# Patient Record
Sex: MALE | Race: WHITE | NOT HISPANIC OR LATINO | URBAN - METROPOLITAN AREA
[De-identification: names, ages, dates, MRNs, and addresses within clinical notes are randomized per-mention and may not be internally consistent; named-entity substitution may affect disease eponyms.]

---

## 2019-04-10 ENCOUNTER — OFFICE (AMBULATORY)
Dept: URBAN - METROPOLITAN AREA PATHOLOGY 4 | Facility: PATHOLOGY | Age: 74
End: 2019-04-10

## 2019-04-10 DIAGNOSIS — Z86.010 PERSONAL HISTORY OF COLONIC POLYPS: ICD-10-CM

## 2019-04-10 DIAGNOSIS — D12.0 BENIGN NEOPLASM OF CECUM: ICD-10-CM

## 2019-04-10 DIAGNOSIS — D12.3 BENIGN NEOPLASM OF TRANSVERSE COLON: ICD-10-CM

## 2019-04-10 PROCEDURE — 88305 TISSUE EXAM BY PATHOLOGIST: CPT | Mod: 26 | Performed by: INTERNAL MEDICINE

## 2019-04-11 ENCOUNTER — ON CAMPUS - OUTPATIENT (AMBULATORY)
Dept: URBAN - METROPOLITAN AREA HOSPITAL 2 | Facility: HOSPITAL | Age: 74
End: 2019-04-11

## 2019-04-11 ENCOUNTER — HOSPITAL ENCOUNTER (OUTPATIENT)
Dept: OTHER | Facility: HOSPITAL | Age: 74
Setting detail: SPECIMEN
Discharge: HOME | End: 2019-04-11
Attending: INTERNAL MEDICINE | Admitting: INTERNAL MEDICINE

## 2019-04-11 VITALS
HEART RATE: 79 BPM | HEART RATE: 84 BPM | DIASTOLIC BLOOD PRESSURE: 50 MMHG | OXYGEN SATURATION: 96 % | DIASTOLIC BLOOD PRESSURE: 46 MMHG | OXYGEN SATURATION: 97 % | SYSTOLIC BLOOD PRESSURE: 79 MMHG | RESPIRATION RATE: 18 BRPM | HEART RATE: 72 BPM | SYSTOLIC BLOOD PRESSURE: 91 MMHG | WEIGHT: 215 LBS | DIASTOLIC BLOOD PRESSURE: 71 MMHG | HEART RATE: 80 BPM | SYSTOLIC BLOOD PRESSURE: 117 MMHG | DIASTOLIC BLOOD PRESSURE: 76 MMHG | SYSTOLIC BLOOD PRESSURE: 142 MMHG | HEIGHT: 70 IN | SYSTOLIC BLOOD PRESSURE: 145 MMHG | HEART RATE: 68 BPM | DIASTOLIC BLOOD PRESSURE: 70 MMHG | OXYGEN SATURATION: 95 % | HEART RATE: 81 BPM | OXYGEN SATURATION: 94 % | HEART RATE: 76 BPM | RESPIRATION RATE: 16 BRPM | HEART RATE: 83 BPM | SYSTOLIC BLOOD PRESSURE: 95 MMHG | TEMPERATURE: 97 F | DIASTOLIC BLOOD PRESSURE: 56 MMHG | RESPIRATION RATE: 12 BRPM | DIASTOLIC BLOOD PRESSURE: 45 MMHG | SYSTOLIC BLOOD PRESSURE: 144 MMHG | SYSTOLIC BLOOD PRESSURE: 104 MMHG

## 2019-04-11 DIAGNOSIS — Z86.010 PERSONAL HISTORY OF COLONIC POLYPS: ICD-10-CM

## 2019-04-11 DIAGNOSIS — K57.30 DIVERTICULOSIS OF LARGE INTESTINE WITHOUT PERFORATION OR ABS: ICD-10-CM

## 2019-04-11 DIAGNOSIS — D12.2 BENIGN NEOPLASM OF ASCENDING COLON: ICD-10-CM

## 2019-04-11 DIAGNOSIS — D12.0 BENIGN NEOPLASM OF CECUM: ICD-10-CM

## 2019-04-11 DIAGNOSIS — D12.3 BENIGN NEOPLASM OF TRANSVERSE COLON: ICD-10-CM

## 2019-04-11 DIAGNOSIS — K64.0 FIRST DEGREE HEMORRHOIDS: ICD-10-CM

## 2019-04-11 LAB
GI HISTOLOGY: A. SELECT: (no result)
GI HISTOLOGY: B. UNSPECIFIED: (no result)
GI HISTOLOGY: PDF REPORT: (no result)

## 2019-04-11 PROCEDURE — 45385 COLONOSCOPY W/LESION REMOVAL: CPT | Mod: PT | Performed by: INTERNAL MEDICINE

## 2019-09-03 ENCOUNTER — OFFICE VISIT (OUTPATIENT)
Dept: CARDIOLOGY | Facility: CLINIC | Age: 74
End: 2019-09-03

## 2019-09-03 VITALS
WEIGHT: 227 LBS | HEIGHT: 70 IN | DIASTOLIC BLOOD PRESSURE: 82 MMHG | HEART RATE: 87 BPM | SYSTOLIC BLOOD PRESSURE: 158 MMHG | BODY MASS INDEX: 32.5 KG/M2

## 2019-09-03 DIAGNOSIS — I25.810 CORONARY ARTERY DISEASE INVOLVING CORONARY BYPASS GRAFT OF NATIVE HEART WITHOUT ANGINA PECTORIS: Primary | ICD-10-CM

## 2019-09-03 DIAGNOSIS — I10 ESSENTIAL HYPERTENSION: ICD-10-CM

## 2019-09-03 DIAGNOSIS — Z95.1 S/P CABG (CORONARY ARTERY BYPASS GRAFT): ICD-10-CM

## 2019-09-03 DIAGNOSIS — E78.2 MIXED HYPERLIPIDEMIA: ICD-10-CM

## 2019-09-03 PROCEDURE — 99213 OFFICE O/P EST LOW 20 MIN: CPT | Performed by: INTERNAL MEDICINE

## 2019-09-03 PROCEDURE — 93000 ELECTROCARDIOGRAM COMPLETE: CPT | Performed by: INTERNAL MEDICINE

## 2019-09-03 RX ORDER — ASPIRIN 81 MG/1
81 TABLET ORAL DAILY
COMMUNITY
Start: 2013-06-26

## 2019-09-03 RX ORDER — LANCETS
EACH MISCELLANEOUS
Refills: 3 | COMMUNITY
Start: 2019-07-18

## 2019-09-03 RX ORDER — BLOOD SUGAR DIAGNOSTIC
STRIP MISCELLANEOUS
Refills: 3 | COMMUNITY
Start: 2019-07-15

## 2019-09-03 RX ORDER — SIMVASTATIN 10 MG
10 TABLET ORAL NIGHTLY
COMMUNITY
Start: 2013-06-26

## 2019-09-03 RX ORDER — NITROGLYCERIN 0.4 MG/1
TABLET SUBLINGUAL
COMMUNITY

## 2019-09-03 RX ORDER — ICOSAPENT ETHYL 1000 MG/1
2 CAPSULE ORAL 2 TIMES DAILY WITH MEALS
Qty: 270 CAPSULE | Refills: 4 | Status: SHIPPED | OUTPATIENT
Start: 2019-09-03 | End: 2020-02-11

## 2019-09-03 RX ORDER — LOSARTAN POTASSIUM 50 MG/1
TABLET ORAL
COMMUNITY
End: 2020-02-04 | Stop reason: DRUGHIGH

## 2019-09-03 RX ORDER — LANSOPRAZOLE 15 MG/1
15 CAPSULE, DELAYED RELEASE ORAL DAILY
COMMUNITY
Start: 2013-06-26

## 2019-09-03 RX ORDER — METOPROLOL SUCCINATE 25 MG/1
25 TABLET, EXTENDED RELEASE ORAL DAILY
Refills: 3 | COMMUNITY
Start: 2019-06-21 | End: 2023-01-31

## 2019-09-03 RX ORDER — NIACIN 500 MG
500 TABLET ORAL
COMMUNITY
End: 2022-08-02 | Stop reason: ALTCHOICE

## 2019-09-03 RX ORDER — SITAGLIPTIN 100 MG/1
100 TABLET, FILM COATED ORAL DAILY
Refills: 3 | COMMUNITY
Start: 2019-07-15

## 2019-09-03 NOTE — PROGRESS NOTES
Visit Type:  Follow-up Visit- 6 month   Primary Care Provider:  [facility]ley          History of Present Illness:     Dear Florencio        CC:  Follow-up  for coronary artery disease,  previous CABG,  hypertension,  previous CVA dyslipidemia      Mr. Ras Zhao is a very pleasant 73 years old gentleman with history of chronic ischemic heart disease, status post previous CABG, previous CVA, hypertension and dyslipidemia. He denies any chest pain, dyspnea, or palpitations.         Mr. Zhao is quite stable from cardiac standpoint, and we will see him in the office in 6 moHe has discrepancy in his farm blood pressure is lower in the right arm.  His current therapy will be continued and will get his lipid profile from your office.     His last lipid profile in 3/1/2019 showed total cholesterol 159 triglycerides 216 HDL 45 LDL of 71.    I have instructed him to stop his niacin because of no outcome data available for major adverse cardiac events.  I have recommended Vascepa 2 g twice daily for secondary prevention/primary prevention since he is diabetic.  His blood pressure initially was 158/82 in the left arm.  Blood pressure on repeat check in the left arm was 132/80 and on the right arm was 140/78.    His EKG today showed normal sinus rhythm with incomplete right bundle branch block left posterior fascicular block and nonspecific ST-T changes similar to previous EKG of 2/19/2019.  His NH interval was 130 ms QRS duration was 119 ms QTC was 468 ms and QRS axis was 118.  EKG  Was similar to the one      Assessment/plan:    1.  Coronary artery disease status post CABG no anginal symptoms.    2.  Bifascicular block with incomplete right bundle branch block and left posterior fascicular block as before.    3.  Hypertension.  Blood pressure as noted above    4.  Dyslipidemia with hypertriglyceridemia.  Vest CPAP has been recommended and niacin has been discontinued.  If the CPAP is not approved by his insurance company will be  glad to send a preauthorization letter.    We will see him in the office in 6 months of follow-up     Thank you very much for allowing us to participate in the care of your patients                   Problems: Active problems were reviewed with the patient during this visit.  Medications: Medications were reviewed with the patient during this visit.  Allergies: Allergies were reviewed with the patient during this visit.  No Known Allergy.                   Past Medical History:     Reviewed history from 08/28/2013 and no changes required:        Hyperlipidemia        Hypertension        Coronary Artery Disease: S/P CABG         Diabetes, Type 2        C V A / Stroke- Hx: Left         Cerebrovascular Disease: with bilateral stenosis ,hx of. : S/P Endarterectomy         Hx: Gastroesphageal reflux     Past Surgical History:     Reviewed history from 08/27/2013 and no changes required:        JUANY PAVON G: Sept. 24,2008-- triple- ant. oblique marginal, LAD at the diagonal, posterior marginal         Cardiac Cath:9-23- 2008        Carotid Endarterectomy: Oct. 24, 2008     Active Medications (reviewed today):  JANUVIA TABLET (SITAGLIPTIN PHOSPHATE TABS) Take one by mouth daily  * MUPIROCIN CLOBETASOL TARO Twice daily  VITAMIN C TABLET (ASCORBIC ACID TABS) 1000mg Take one by mouth daily  TOPROL XL 25 MG ORAL TABLET EXTENDED RELEASE 24 HOUR (METOPROLOL SUCCINATE) Take 1 tablet by mouth daily  SIMVASTATIN 10 MG ORAL TABLET (SIMVASTATIN) Take 1 tablet by mouth daily  PREVACID 15 MG ORAL CAPSULE DELAYED RELEASE (LANSOPRAZOLE) Take 1 tablet by mouth daily  NITROSTAT 0.4 MG SUBLINGUAL TABLET SUBLINGUAL (NITROGLYCERIN) Place one tablet under tongue for chest pain as directed - PRN  NIACIN 500 MG ORAL TABLET (NIACIN) Take one by mouth daily  METFORMIN  MG ORAL TABLET (METFORMIN HCL) Take one two tablets by mouth twice a day  ZYRTEC ALLERGY 10 MG ORAL CAPSULE (CETIRIZINE HCL) Take one by mouth daily  LOSARTAN POTASSIUM 50 MG ORAL  TABLET (LOSARTAN POTASSIUM) Take one by mouth daily  ASPIR-LOW 81 MG ORAL TABLET DELAYED RELEASE (ASPIRIN) Take 1 tablet by mouth daily  MULTIVITAMINS TABS (MULTIPLE VITAMIN) Take one by mouth daily     Current Allergies (reviewed today):  No known allergies     Family History Summary:      Reviewed history Last on 08/14/2018 and no changes required:02/19/2019        General Comments - FH:  FH Diabetes father and  brothers  FH Heart Disease mother  FH Stroke-paternal grandmother  FH Hypertension- most of family         Social History:     Reviewed history from 08/28/2013 and no changes required:                1 daughter         Retired            Risk Factors:      Smoked Tobacco Use:  Former smoker     Cigarettes:  Yes -- 1 pack(s) per day,    Pack-years:  50 years --- 1 ppd - wkends 1-1/2 pack         Year started:  age 13         Year quit:  2000 or 2002  Smokeless Tobacco Use:  Never  Passive smoke exposure:  no  Drug use:  no  HIV high-risk behavior:  no  Caffeine use:  1 drinks per day  Alcohol use:  no  Exercise:  yes     Times per week:  3     Type of Exercise:  [employer]  Seatbelt use:  100 %  Sun Exposure:  occasionally     Family History Risk Factors:     Family History of MI in females < 65 years old:  no     Family History of MI in males < 55 years old:  no           Review of Systems   General: denies fevers, chills, sweats, anorexia, fatigue, malaise, weight loss  Eyes: denies blurring, diplopia, irritation, discharge, vision loss, eye pain, photophobia  Cardiovascular: History of coronary artery disease. Status post coronary artery bypass surgery 2008. History of previous CVA. History of bilateral carotid endarterectomy. Hypertension. Dyslipidemia.  Respiratory: Denies cough, dyspnea, excessive sputum, hemoptysis, wheezing  Gastrointestinal: GERD  Musculoskeletal: denies back pain, joint pain, joint swelling, muscle cramps, muscle weakness, stiffness, arthritis  Neurologic:  history of  CVA        Physical Exam     General:      well developed, well nourished, in no acute distress.    Neck:      no masses, thyromegaly, or abnormal cervical nodes. NO JV BRUITS    Lungs:      clear bilaterally to auscultation.    Heart:      non-displaced PMI, chest non-tender; regular rate and rhythm, S1, S2 without murmurs, rubs, or gallops  Pulses:      pulses normal in all 4 extremities.    Extremities:       NO EDEMA

## 2020-02-04 ENCOUNTER — OFFICE VISIT (OUTPATIENT)
Dept: CARDIOLOGY | Facility: CLINIC | Age: 75
End: 2020-02-04

## 2020-02-04 VITALS
BODY MASS INDEX: 32.47 KG/M2 | WEIGHT: 226.8 LBS | HEIGHT: 70 IN | DIASTOLIC BLOOD PRESSURE: 70 MMHG | SYSTOLIC BLOOD PRESSURE: 127 MMHG | OXYGEN SATURATION: 96 % | HEART RATE: 82 BPM

## 2020-02-04 DIAGNOSIS — I10 ESSENTIAL HYPERTENSION: ICD-10-CM

## 2020-02-04 DIAGNOSIS — J43.8 OTHER EMPHYSEMA (HCC): ICD-10-CM

## 2020-02-04 DIAGNOSIS — E78.2 MIXED HYPERLIPIDEMIA: ICD-10-CM

## 2020-02-04 DIAGNOSIS — E11.59 TYPE 2 DIABETES MELLITUS WITH OTHER CIRCULATORY COMPLICATION, WITHOUT LONG-TERM CURRENT USE OF INSULIN (HCC): ICD-10-CM

## 2020-02-04 DIAGNOSIS — Z79.02 LONG TERM (CURRENT) USE OF ANTITHROMBOTICS/ANTIPLATELETS: ICD-10-CM

## 2020-02-04 DIAGNOSIS — I25.10 CORONARY ARTERY DISEASE INVOLVING NATIVE CORONARY ARTERY OF NATIVE HEART WITHOUT ANGINA PECTORIS: Primary | ICD-10-CM

## 2020-02-04 PROCEDURE — 99214 OFFICE O/P EST MOD 30 MIN: CPT | Performed by: INTERNAL MEDICINE

## 2020-02-04 PROCEDURE — 93000 ELECTROCARDIOGRAM COMPLETE: CPT | Performed by: INTERNAL MEDICINE

## 2020-02-04 RX ORDER — LOSARTAN POTASSIUM 100 MG/1
TABLET ORAL DAILY
COMMUNITY
Start: 2019-12-23 | End: 2022-02-15

## 2020-02-04 RX ORDER — MULTIVIT WITH MINERALS/LUTEIN
TABLET ORAL DAILY
COMMUNITY

## 2020-02-04 NOTE — PROGRESS NOTES
"Cardiology Office Visit      Encounter Date:  02/04/2020    Patient ID:   Ras Zhao is a 74 y.o. male.    Reason For Followup:  Coronary artery disease  Hypertension  Hypertensive cardiovascular disease  Hyperlipidemia  Abnormal EKG    Brief Clinical History:  Dear Carlos Enrique Guerrero MD    I had the pleasure of seeing Ras Zhao today. As you are well aware, this is a 74 y.o. male with a history of coronary artery disease.  He is undergone three-vessel CABG in 2008. He has additional history that includes hypertension, hypertensive cardiovascular disease, hyperlipidemia, previous CVA, diabetes, antiplatelet therapy, and carotid artery disease.  He presents today for follow-up on the above conditions.    Interval History:  He denies any chest pain pressure heaviness or tightness.  He does report some shortness of breath however this is not out of character for him.  He denies any PND orthopnea.  He denies any syncope or near syncope.  He reports feeling well from a cardiac perspective.    He reports he had some blood work done at your office.  We will obtain the results from your office.    Assessment & Plan    Impressions:  Coronary artery disease status post three-vessel CABG 2008  Carotid artery disease status post carotid endarterectomy  Hypertension  Hypertensive cardiovascular disease  Hyperlipidemia  Previous CVA  Diabetes mellitus  Antiplatelet therapy  COPD    Recommendations:  Continuation of his current cardiovascular regimen at the present time.  Follow-up in 6 months time sooner should there be difficulties  Obtain labs from your office.       Objective:    Vitals:  Vitals:    02/04/20 1050   BP: 127/70   Pulse: 82   SpO2: 96%   Weight: 103 kg (226 lb 12.8 oz)   Height: 177.8 cm (70\")       Physical Exam:    General: Alert, cooperative, no distress, appears stated age  Head:  Normocephalic, atraumatic, mucous membranes moist  Eyes:  Conjunctiva/corneas clear, EOM's intact     Neck:  Supple, " bruit noted  Lungs: Clear to auscultation bilaterally, no wheezes rhonchi rales are noted  Chest wall: No tenderness  Heart::  Regular rate and rhythm, S1 and S2 normal, 1/6 holosystolic murmur.  No rub or gallop  Abdomen: Soft, non-tender, nondistended bowel sounds active  Extremities: No cyanosis, clubbing, or edema  Pulses: Diminished pedal pulses.  Skin:  No rashes or lesions  Neuro/psych: A&O x3. CN II through XII are grossly intact with appropriate affect      Allergies:  No Known Allergies    Medication Review:     Current Outpatient Medications:   •  ACCU-CHEK CHANDU PLUS test strip, TEST EVERY DAY, Disp: , Rfl: 3  •  ACCU-CHEK SOFTCLIX LANCETS lancets, USE ONE LANCET A DAY FOR CHECKING BLOOD SUGAR., Disp: , Rfl: 3  •  ascorbic acid (VITAMIN C) 1000 MG tablet, Daily., Disp: , Rfl:   •  aspirin (ASPIR-LOW) 81 MG EC tablet, Take 81 mg by mouth Daily., Disp: , Rfl:   •  Cetirizine HCl (ZYRTEC ALLERGY) 10 MG capsule, Daily., Disp: , Rfl:   •  JANUVIA 100 MG tablet, Take 100 mg by mouth Daily., Disp: , Rfl: 3  •  lansoprazole (PREVACID) 15 MG capsule, Take 15 mg by mouth Daily., Disp: , Rfl:   •  losartan (COZAAR) 100 MG tablet, Daily., Disp: , Rfl:   •  metFORMIN (GLUCOPHAGE) 500 MG tablet, Take 1,000 mg by mouth 2 (Two) Times a Day., Disp: , Rfl: 3  •  metoprolol succinate XL (TOPROL-XL) 25 MG 24 hr tablet, Take 25 mg by mouth Daily., Disp: , Rfl: 3  •  MULTIPLE VITAMIN PO, MULTIVITAMINS TABS, Disp: , Rfl:   •  Mupirocin 2 % kit, 2 (Two) Times a Day., Disp: , Rfl:   •  niacin 500 MG tablet, Take 500 mg by mouth Daily With Breakfast., Disp: , Rfl:   •  nitroglycerin (NITROSTAT) 0.4 MG SL tablet, NITROSTAT 0.4 MG SUBL, Disp: , Rfl:   •  simvastatin (ZOCOR) 10 MG tablet, Take 10 mg by mouth Every Night., Disp: , Rfl:   •  icosapent ethyl (VASCEPA) 1 g capsule capsule, Take 2 g by mouth 2 (Two) Times a Day With Meals., Disp: 270 capsule, Rfl: 4    Family History:  Family History   Problem Relation Age of Onset   •  Heart disease Mother    • Stroke Mother    • Hypertension Mother    • Diabetes Father    • Hypertension Father    • Diabetes Brother    • Hypertension Brother        Past Medical History:  Past Medical History:   Diagnosis Date   • Coronary artery disease    • Diabetes mellitus (CMS/[facility])    • H/O carotid endarterectomy        • Hyperlipidemia    • Hypertension    • Sleep apnea     C-pap machine    • Stroke (CMS/HCC)     Mini stroke        Past surgical History:  Past Surgical History:   Procedure Laterality Date   • ARTERIAL BYPASS SURGERY     • CARDIAC CATHETERIZATION     • CAROTID ENDARTERECTOMY      bilaterally / Dr. Oconnor    • CORONARY ARTERY BYPASS GRAFT      triple CABG       Social History:  Social History     Socioeconomic History   • Marital status:      Spouse name: Not on file   • Number of children: Not on file   • Years of education: Not on file   • Highest education level: Not on file   Tobacco Use   • Smoking status: Former Smoker     Packs/day: 1.00     Types: Cigarettes     Last attempt to quit:      Years since quittin.1   • Smokeless tobacco: Never Used   Substance and Sexual Activity   • Alcohol use: No     Frequency: Never   • Drug use: Never       Review of Systems:  The following systems were reviewed as they relate to the cardiovascular system: Constitutional, Eyes, ENT, Cardiovascular, Respiratory, Gastrointestinal, Integumentary, Neurological, Psychiatric, Hematologic, Endocrine, Musculoskeletal, and Genitourinary. The pertinent cardiovascular findings are reported above with all other cardiovascular points within those systems being negative.    Diagnostic Study Review:     Current Electrocardiogram:    ECG 12 Lead  Date/Time: 2020 7:38 PM  Performed by: Kyle Adam DO  Authorized by: Kyle Adam DO   Comparison: not compared with previous ECG   Previous ECG: no previous ECG available  Comments: Normal sinus rhythm with a  ventricular rate of 82 bpm.  Atrial premature complexes noted.  Incomplete right bundle branch block.  Low voltage in the precordial leads.  Normal QT and QTc intervals.  Repolarization abnormalities consider ischemia.  Right axis deviation.              NOTE: The following portions of the patient's history were reviewed and updated this visit as appropriate: allergies, current medications, past family history, past medical history, past social history, past surgical history and problem list.

## 2020-02-10 PROBLEM — E11.9 TYPE 2 DIABETES MELLITUS (HCC): Status: ACTIVE | Noted: 2020-02-10

## 2020-02-10 PROBLEM — I25.10 CORONARY ARTERY DISEASE INVOLVING NATIVE CORONARY ARTERY OF NATIVE HEART WITHOUT ANGINA PECTORIS: Status: ACTIVE | Noted: 2020-02-10

## 2020-02-10 PROBLEM — I10 ESSENTIAL HYPERTENSION: Status: ACTIVE | Noted: 2020-02-10

## 2020-02-10 PROBLEM — Z79.02 LONG TERM (CURRENT) USE OF ANTITHROMBOTICS/ANTIPLATELETS: Status: ACTIVE | Noted: 2020-02-10

## 2020-02-10 PROBLEM — E78.2 MIXED HYPERLIPIDEMIA: Status: ACTIVE | Noted: 2020-02-10

## 2020-02-10 PROBLEM — J43.8 OTHER EMPHYSEMA: Status: ACTIVE | Noted: 2020-02-10

## 2020-08-04 ENCOUNTER — OFFICE VISIT (OUTPATIENT)
Dept: CARDIOLOGY | Facility: CLINIC | Age: 75
End: 2020-08-04

## 2020-08-04 VITALS
DIASTOLIC BLOOD PRESSURE: 79 MMHG | WEIGHT: 227 LBS | OXYGEN SATURATION: 95 % | HEART RATE: 88 BPM | BODY MASS INDEX: 32.5 KG/M2 | HEIGHT: 70 IN | RESPIRATION RATE: 18 BRPM | SYSTOLIC BLOOD PRESSURE: 154 MMHG

## 2020-08-04 DIAGNOSIS — Z79.02 LONG TERM (CURRENT) USE OF ANTITHROMBOTICS/ANTIPLATELETS: ICD-10-CM

## 2020-08-04 DIAGNOSIS — E78.2 MIXED HYPERLIPIDEMIA: ICD-10-CM

## 2020-08-04 DIAGNOSIS — I10 ESSENTIAL HYPERTENSION: ICD-10-CM

## 2020-08-04 DIAGNOSIS — I25.10 CORONARY ARTERY DISEASE INVOLVING NATIVE CORONARY ARTERY OF NATIVE HEART WITHOUT ANGINA PECTORIS: ICD-10-CM

## 2020-08-04 DIAGNOSIS — J43.8 OTHER EMPHYSEMA (HCC): ICD-10-CM

## 2020-08-04 DIAGNOSIS — Z95.1 HX OF CABG: Primary | ICD-10-CM

## 2020-08-04 DIAGNOSIS — E11.59 TYPE 2 DIABETES MELLITUS WITH OTHER CIRCULATORY COMPLICATION, WITHOUT LONG-TERM CURRENT USE OF INSULIN (HCC): ICD-10-CM

## 2020-08-04 PROCEDURE — 93000 ELECTROCARDIOGRAM COMPLETE: CPT | Performed by: INTERNAL MEDICINE

## 2020-08-04 PROCEDURE — 99214 OFFICE O/P EST MOD 30 MIN: CPT | Performed by: INTERNAL MEDICINE

## 2020-08-04 NOTE — PATIENT INSTRUCTIONS
Get labs from [facility]  [facility] nuclear prior to next visit  Monitor blood pressure and log values. Bring to next visit  F/U 6 months

## 2020-08-04 NOTE — PROGRESS NOTES
Cardiology Office Visit      Encounter Date:  08/04/2020    Patient ID:   Ras Zhao is a 75 y.o. male.    Reason For Followup:  Coronary artery disease  Hypertension  Hypertensive cardiovascular disease  Hyperlipidemia  Abnormal EKG    Brief Clinical History:  Dear Carlos Enrique Guerrero MD    I had the pleasure of seeing Ras Zhao today. As you are well aware, this is a 75 y.o. male with a history of coronary artery disease.  He is undergone three-vessel CABG in 2008. He has additional history that includes hypertension, hypertensive cardiovascular disease, hyperlipidemia, previous CVA, diabetes, antiplatelet therapy, and carotid artery disease.  He presents today for follow-up on the above conditions.    Interval History:  He denies any chest pain pressure heaviness or tightness.  He does report some shortness of breath however this is not out of character for him.  He denies any PND orthopnea.  He denies any syncope or near syncope.  He reports feeling well from a cardiac perspective.    His blood pressure is elevated today.  He reports that it has been up and down recently.  At your office, it was 130 systolic but at Dr. Castellon's office later that day, it was in the 150 systolic.  We discussed options.  He will check his blood pressure at home and log the values.  He will also bring his blood pressure cuff and logs to his next visit so that we can make an informed decision on medical therapy.    His CABG was performed 12 years ago.  He has not had any ischemic work-up since this was performed.  We discussed options we will proceed with nuclear stress testing prior to his next visit.    Assessment & Plan    Impressions:  Coronary artery disease status post three-vessel CABG 2008  Carotid artery disease status post carotid endarterectomy  Hypertension  Hypertensive cardiovascular disease  Hyperlipidemia  Previous CVA  Diabetes mellitus  Antiplatelet therapy  COPD    Recommendations:  Continuation of his  "current cardiovascular regimen at the present time.  Follow-up in 6 months time sooner should there be difficulties  Obtain labs from your office.    Objective:    Vitals:  Vitals:    08/04/20 0945   BP: 154/79   BP Location: Left arm   Patient Position: Sitting   Cuff Size: Large Adult   Pulse: 88   Resp: 18   SpO2: 95%   Weight: 103 kg (227 lb)   Height: 177.8 cm (70\")       Physical Exam:    General: Alert, cooperative, no distress, appears stated age  Head:  Normocephalic, atraumatic, mucous membranes moist  Eyes:  Conjunctiva/corneas clear, EOM's intact     Neck:  Supple, bruit noted  Lungs: Clear to auscultation bilaterally, no wheezes rhonchi rales are noted  Chest wall: No tenderness  Heart::  Regular rate and rhythm, S1 and S2 normal, 1/6 holosystolic murmur.  No rub or gallop  Abdomen: Soft, non-tender, nondistended bowel sounds active  Extremities: No cyanosis, clubbing, or edema  Pulses: Diminished pedal pulses.  Skin:  No rashes or lesions  Neuro/psych: A&O x3. CN II through XII are grossly intact with appropriate affect      Allergies:  No Known Allergies    Medication Review:     Current Outpatient Medications:   •  ACCU-CHEK CHANDU PLUS test strip, TEST EVERY DAY, Disp: , Rfl: 3  •  ACCU-CHEK SOFTCLIX LANCETS lancets, USE ONE LANCET A DAY FOR CHECKING BLOOD SUGAR., Disp: , Rfl: 3  •  ascorbic acid (VITAMIN C) 1000 MG tablet, Daily., Disp: , Rfl:   •  aspirin (ASPIR-LOW) 81 MG EC tablet, Take 81 mg by mouth Daily., Disp: , Rfl:   •  Cetirizine HCl (ZYRTEC ALLERGY) 10 MG capsule, Daily., Disp: , Rfl:   •  JANUVIA 100 MG tablet, Take 100 mg by mouth Daily., Disp: , Rfl: 3  •  lansoprazole (PREVACID) 15 MG capsule, Take 15 mg by mouth Daily., Disp: , Rfl:   •  losartan (COZAAR) 100 MG tablet, Daily., Disp: , Rfl:   •  metFORMIN (GLUCOPHAGE) 500 MG tablet, Take 1,000 mg by mouth 2 (Two) Times a Day., Disp: , Rfl: 3  •  metoprolol succinate XL (TOPROL-XL) 25 MG 24 hr tablet, Take 25 mg by mouth Daily., " Disp: , Rfl: 3  •  MULTIPLE VITAMIN PO, MULTIVITAMINS TABS, Disp: , Rfl:   •  Mupirocin 2 % kit, 2 (Two) Times a Day., Disp: , Rfl:   •  niacin 500 MG tablet, Take 500 mg by mouth Daily With Breakfast., Disp: , Rfl:   •  nitroglycerin (NITROSTAT) 0.4 MG SL tablet, NITROSTAT 0.4 MG SUBL, Disp: , Rfl:   •  simvastatin (ZOCOR) 10 MG tablet, Take 10 mg by mouth Every Night., Disp: , Rfl:     Family History:  Family History   Problem Relation Age of Onset   • Heart disease Mother    • Stroke Mother    • Hypertension Mother    • Diabetes Father    • Hypertension Father    • Diabetes Brother    • Hypertension Brother        Past Medical History:  Past Medical History:   Diagnosis Date   • Coronary artery disease    • Diabetes mellitus (CMS/HCC)    • H/O carotid endarterectomy        • Hyperlipidemia    • Hypertension    • Sleep apnea     C-pap machine    • Stroke (CMS/HCC)     Mini stroke        Past surgical History:  Past Surgical History:   Procedure Laterality Date   • ARTERIAL BYPASS SURGERY     • CARDIAC CATHETERIZATION     • CAROTID ENDARTERECTOMY      bilaterally / Dr. Oconnor    • CORONARY ARTERY BYPASS GRAFT      triple CABG       Social History:  Social History     Socioeconomic History   • Marital status:      Spouse name: Not on file   • Number of children: Not on file   • Years of education: Not on file   • Highest education level: Not on file   Tobacco Use   • Smoking status: Former Smoker     Packs/day: 1.00     Types: Cigarettes     Last attempt to quit: 2002     Years since quittin.6   • Smokeless tobacco: Never Used   Substance and Sexual Activity   • Alcohol use: No     Frequency: Never   • Drug use: Never       Review of Systems:  The following systems were reviewed as they relate to the cardiovascular system: Constitutional, Eyes, ENT, Cardiovascular, Respiratory, Gastrointestinal, Integumentary, Neurological, Psychiatric, Hematologic, Endocrine, Musculoskeletal, and Genitourinary.  The pertinent cardiovascular findings are reported above with all other cardiovascular points within those systems being negative.    Diagnostic Study Review:     Current Electrocardiogram:    ECG 12 Lead  Date/Time: 8/4/2020 5:53 PM  Performed by: Kyle Adam DO  Authorized by: Kyle Adam DO   Comparison: not compared with previous ECG   Previous ECG: no previous ECG available  Comments: Normal sinus rhythm with a ventricular rate of 84 bpm.  Incomplete right bundle branch block and left posterior fascicular block.  Old anterior MI.  Normal QT and QTc intervals.              NOTE: The following portions of the patient's history were reviewed and updated this visit as appropriate: allergies, current medications, past family history, past medical history, past social history, past surgical history and problem list.

## 2020-12-08 ENCOUNTER — HOSPITAL ENCOUNTER (OUTPATIENT)
Dept: CARDIOLOGY | Facility: HOSPITAL | Age: 75
Discharge: HOME | End: 2020-12-08

## 2020-12-08 DIAGNOSIS — Z95.1 HX OF CABG: ICD-10-CM

## 2020-12-08 PROCEDURE — 25010000002 REGADENOSON 0.4 MG/5ML SOLUTION: Performed by: INTERNAL MEDICINE

## 2020-12-08 PROCEDURE — A9500 TC99M SESTAMIBI: HCPCS | Performed by: INTERNAL MEDICINE

## 2020-12-08 PROCEDURE — 78452 HT MUSCLE IMAGE SPECT MULT: CPT

## 2020-12-08 PROCEDURE — 0 TECHNETIUM SESTAMIBI: Performed by: INTERNAL MEDICINE

## 2020-12-08 PROCEDURE — 78452 HT MUSCLE IMAGE SPECT MULT: CPT | Performed by: INTERNAL MEDICINE

## 2020-12-08 PROCEDURE — 93016 CV STRESS TEST SUPVJ ONLY: CPT | Performed by: INTERNAL MEDICINE

## 2020-12-08 PROCEDURE — 93018 CV STRESS TEST I&R ONLY: CPT | Performed by: INTERNAL MEDICINE

## 2020-12-08 PROCEDURE — 93017 CV STRESS TEST TRACING ONLY: CPT

## 2020-12-08 RX ADMIN — REGADENOSON 0.4 MG: 0.08 INJECTION, SOLUTION INTRAVENOUS at 11:20

## 2020-12-08 RX ADMIN — TECHNETIUM TC 99M SESTAMIBI 1 DOSE: 1 INJECTION INTRAVENOUS at 09:05

## 2020-12-08 RX ADMIN — TECHNETIUM TC 99M SESTAMIBI 1 DOSE: 1 INJECTION INTRAVENOUS at 11:20

## 2020-12-15 LAB
BH CV STRESS BP STAGE 1: NORMAL
BH CV STRESS COMMENTS STAGE 1: NORMAL
BH CV STRESS DOSE REGADENOSON STAGE 1: 0.4
BH CV STRESS DURATION MIN STAGE 1: 0
BH CV STRESS DURATION SEC STAGE 1: 10
BH CV STRESS HR STAGE 1: 92
BH CV STRESS PROTOCOL 1: NORMAL
BH CV STRESS RECOVERY BP: NORMAL MMHG
BH CV STRESS RECOVERY HR: 87 BPM
BH CV STRESS STAGE 1: 1
LV EF NUC BP: 50 %
MAXIMAL PREDICTED HEART RATE: 145 BPM
PERCENT MAX PREDICTED HR: 63.45 %
STRESS BASELINE BP: NORMAL MMHG
STRESS BASELINE HR: 76 BPM
STRESS PERCENT HR: 75 %
STRESS POST PEAK BP: NORMAL MMHG
STRESS POST PEAK HR: 92 BPM
STRESS TARGET HR: 123 BPM

## 2021-01-26 RX ORDER — IBUPROFEN 800 MG/1
800 TABLET ORAL EVERY 8 HOURS PRN
COMMUNITY
Start: 2020-11-12 | End: 2022-08-02 | Stop reason: ALTCHOICE

## 2021-01-26 RX ORDER — DESOXIMETASONE 2.5 MG/G
CREAM TOPICAL
COMMUNITY
Start: 2020-11-11 | End: 2022-08-02 | Stop reason: ALTCHOICE

## 2021-02-03 NOTE — PROGRESS NOTES
Cardiology Office Visit      Encounter Date:  02/04/2021    Patient ID:   Ras Zaho is a 75 y.o. male.    Reason For Followup:  Coronary artery disease  Hypertension  Hypertensive cardiovascular disease  Hyperlipidemia  Abnormal EKG    Brief Clinical History:  Dear Carlos Enrique Guerrero MD    I had the pleasure of seeing Ras Zhao today. As you are well aware, this is a 75 y.o. male with a history of coronary artery disease.  He is undergone three-vessel CABG in 2008. He has additional history that includes hypertension, hypertensive cardiovascular disease, hyperlipidemia, previous CVA, diabetes, antiplatelet therapy, and carotid artery disease.  He presents today for follow-up on the above conditions.    Interval History:  He denies any chest pain pressure heaviness or tightness.  He does report some shortness of breath however this is not out of character for him.  He denies any PND orthopnea.  He denies any syncope or near syncope.  He reports feeling well from a cardiac perspective.    His blood pressure is much better controlled today.  He reports that he has been continuing to monitor his blood pressure regularly.    His CABG was performed 12 years ago.  Because of the age of his grafts, he underwent screening stress testing.  His nuclear stress test was performed and December 2020 and demonstrated no evidence of provokable ischemia.  We discussed these results today in detail.    He had his first Covid shot and is scheduled for his second shot next week.  He had the Moderna vaccination.  Additionally he reports he had some blood work done at your office last Tuesday.  We will request this for review.    Assessment & Plan    Impressions:  Coronary artery disease status post three-vessel CABG 2008  Carotid artery disease status post carotid endarterectomy     Followed by Dr. Castellon  Hypertension  Hypertensive cardiovascular disease  Hyperlipidemia  Previous CVA  Diabetes mellitus  Antiplatelet  "therapy  COPD    Recommendations:  Continuation of his current cardiovascular regimen at the present time.  Follow-up in 6 months time sooner should there be difficulties  Obtain labs from your office.    Objective:    Vitals:  Vitals:    02/04/21 1011   BP: 113/72   BP Location: Left arm   Patient Position: Sitting   Cuff Size: Large Adult   Pulse: 94   Resp: 18   SpO2: 97%   Weight: 102 kg (225 lb)   Height: 177.8 cm (70\")       Physical Exam:    General: Alert, cooperative, no distress, appears stated age  Head:  Normocephalic, atraumatic, mucous membranes moist  Eyes:  Conjunctiva/corneas clear, EOM's intact     Neck:  Supple, bruit noted  Lungs: Clear to auscultation bilaterally, no wheezes rhonchi rales are noted  Chest wall: No tenderness  Heart::  Regular rate and rhythm, S1 and S2 normal, 1/6 holosystolic murmur.  No rub or gallop  Abdomen: Soft, non-tender, nondistended bowel sounds active  Extremities: No cyanosis, clubbing, or edema  Pulses: Diminished pedal pulses.  Skin:  No rashes or lesions  Neuro/psych: A&O x3. CN II through XII are grossly intact with appropriate affect      Allergies:  No Known Allergies    Medication Review:     Current Outpatient Medications:   •  ACCU-CHEK CHANDU PLUS test strip, TEST EVERY DAY, Disp: , Rfl: 3  •  ACCU-CHEK SOFTCLIX LANCETS lancets, USE ONE LANCET A DAY FOR CHECKING BLOOD SUGAR., Disp: , Rfl: 3  •  ascorbic acid (VITAMIN C) 1000 MG tablet, Daily., Disp: , Rfl:   •  aspirin (ASPIR-LOW) 81 MG EC tablet, Take 81 mg by mouth Daily., Disp: , Rfl:   •  Cetirizine HCl (ZYRTEC ALLERGY) 10 MG capsule, Daily., Disp: , Rfl:   •  desoximetasone (TOPICORT) 0.25 % cream, APPLY TO AFFECTED AREA TWICE A DAY, Disp: , Rfl:   •  ibuprofen (ADVIL,MOTRIN) 800 MG tablet, Take 800 mg by mouth Every 8 (Eight) Hours As Needed. for pain, Disp: , Rfl:   •  JANUVIA 100 MG tablet, Take 100 mg by mouth Daily., Disp: , Rfl: 3  •  lansoprazole (PREVACID) 15 MG capsule, Take 15 mg by mouth " Daily., Disp: , Rfl:   •  losartan (COZAAR) 100 MG tablet, Daily., Disp: , Rfl:   •  metFORMIN (GLUCOPHAGE) 500 MG tablet, Take 1,000 mg by mouth 2 (Two) Times a Day., Disp: , Rfl: 3  •  metoprolol succinate XL (TOPROL-XL) 25 MG 24 hr tablet, Take 25 mg by mouth Daily., Disp: , Rfl: 3  •  MULTIPLE VITAMIN PO, MULTIVITAMINS TABS, Disp: , Rfl:   •  Mupirocin 2 % kit, 2 (Two) Times a Day., Disp: , Rfl:   •  niacin 500 MG tablet, Take 500 mg by mouth Daily With Breakfast., Disp: , Rfl:   •  nitroglycerin (NITROSTAT) 0.4 MG SL tablet, NITROSTAT 0.4 MG SUBL, Disp: , Rfl:   •  simvastatin (ZOCOR) 10 MG tablet, Take 10 mg by mouth Every Night., Disp: , Rfl:     Family History:  Family History   Problem Relation Age of Onset   • Heart disease Mother    • Stroke Mother    • Hypertension Mother    • Diabetes Father    • Hypertension Father    • Diabetes Brother    • Hypertension Brother        Past Medical History:  Past Medical History:   Diagnosis Date   • Coronary artery disease    • Diabetes mellitus (CMS/HCC)    • H/O carotid endarterectomy        • Hyperlipidemia    • Hypertension    • Sleep apnea     C-pap machine    • Stroke (CMS/HCC)     Mini stroke        Past surgical History:  Past Surgical History:   Procedure Laterality Date   • ARTERIAL BYPASS SURGERY     • CARDIAC CATHETERIZATION     • CAROTID ENDARTERECTOMY      bilaterally / Dr. Oconnor    • CORONARY ARTERY BYPASS GRAFT      triple CABG       Social History:  Social History     Socioeconomic History   • Marital status:      Spouse name: Not on file   • Number of children: Not on file   • Years of education: Not on file   • Highest education level: Not on file   Tobacco Use   • Smoking status: Former Smoker     Packs/day: 1.00     Types: Cigarettes     Quit date:      Years since quittin.1   • Smokeless tobacco: Never Used   Substance and Sexual Activity   • Alcohol use: No     Frequency: Never   • Drug use: Never       Review of  Systems:  The following systems were reviewed as they relate to the cardiovascular system: Constitutional, Eyes, ENT, Cardiovascular, Respiratory, Gastrointestinal, Integumentary, Neurological, Psychiatric, Hematologic, Endocrine, Musculoskeletal, and Genitourinary. The pertinent cardiovascular findings are reported above with all other cardiovascular points within those systems being negative.    Diagnostic Study Review:     Current Electrocardiogram:  Procedures no new EKG.      NOTE: The following portions of the patient's history were reviewed and updated this visit as appropriate: allergies, current medications, past family history, past medical history, past social history, past surgical history and problem list.

## 2021-02-04 ENCOUNTER — OFFICE VISIT (OUTPATIENT)
Dept: CARDIOLOGY | Facility: CLINIC | Age: 76
End: 2021-02-04

## 2021-02-04 VITALS
SYSTOLIC BLOOD PRESSURE: 113 MMHG | OXYGEN SATURATION: 97 % | HEART RATE: 94 BPM | DIASTOLIC BLOOD PRESSURE: 72 MMHG | HEIGHT: 70 IN | WEIGHT: 225 LBS | RESPIRATION RATE: 18 BRPM | BODY MASS INDEX: 32.21 KG/M2

## 2021-02-04 DIAGNOSIS — E11.59 TYPE 2 DIABETES MELLITUS WITH OTHER CIRCULATORY COMPLICATION, WITHOUT LONG-TERM CURRENT USE OF INSULIN (HCC): ICD-10-CM

## 2021-02-04 DIAGNOSIS — J43.8 OTHER EMPHYSEMA (HCC): ICD-10-CM

## 2021-02-04 DIAGNOSIS — Z95.1 HX OF CABG: ICD-10-CM

## 2021-02-04 DIAGNOSIS — I10 ESSENTIAL HYPERTENSION: ICD-10-CM

## 2021-02-04 DIAGNOSIS — I25.10 CORONARY ARTERY DISEASE INVOLVING NATIVE CORONARY ARTERY OF NATIVE HEART WITHOUT ANGINA PECTORIS: Primary | ICD-10-CM

## 2021-02-04 DIAGNOSIS — Z79.02 LONG TERM (CURRENT) USE OF ANTITHROMBOTICS/ANTIPLATELETS: ICD-10-CM

## 2021-02-04 DIAGNOSIS — E78.2 MIXED HYPERLIPIDEMIA: ICD-10-CM

## 2021-02-04 PROCEDURE — 99214 OFFICE O/P EST MOD 30 MIN: CPT | Performed by: INTERNAL MEDICINE

## 2021-08-05 ENCOUNTER — OFFICE VISIT (OUTPATIENT)
Dept: CARDIOLOGY | Facility: CLINIC | Age: 76
End: 2021-08-05

## 2021-08-05 VITALS
DIASTOLIC BLOOD PRESSURE: 70 MMHG | WEIGHT: 222.2 LBS | HEIGHT: 70 IN | OXYGEN SATURATION: 95 % | HEART RATE: 86 BPM | SYSTOLIC BLOOD PRESSURE: 132 MMHG | BODY MASS INDEX: 31.81 KG/M2

## 2021-08-05 DIAGNOSIS — I25.10 CORONARY ARTERY DISEASE INVOLVING NATIVE CORONARY ARTERY OF NATIVE HEART WITHOUT ANGINA PECTORIS: Primary | ICD-10-CM

## 2021-08-05 DIAGNOSIS — I10 ESSENTIAL HYPERTENSION: ICD-10-CM

## 2021-08-05 DIAGNOSIS — Z79.02 LONG TERM (CURRENT) USE OF ANTITHROMBOTICS/ANTIPLATELETS: ICD-10-CM

## 2021-08-05 DIAGNOSIS — Z95.1 HX OF CABG: ICD-10-CM

## 2021-08-05 PROCEDURE — 99214 OFFICE O/P EST MOD 30 MIN: CPT | Performed by: INTERNAL MEDICINE

## 2021-08-05 PROCEDURE — 93000 ELECTROCARDIOGRAM COMPLETE: CPT | Performed by: INTERNAL MEDICINE

## 2021-08-05 NOTE — PROGRESS NOTES
"Cardiology Office Visit      Encounter Date:  08/05/2021    Patient ID:   Ras Zhao is a 76 y.o. male.    Reason For Followup:  Coronary artery disease  Hypertension  Hypertensive cardiovascular disease  Hyperlipidemia  Abnormal EKG    Brief Clinical History:  Dear Carlos Enrique Guerrero MD    I had the pleasure of seeing Ras Zhao today. As you are well aware, this is a 76 y.o. male with a history of coronary artery disease.  He is undergone three-vessel CABG in 2008. He has additional history that includes hypertension, hypertensive cardiovascular disease, hyperlipidemia, previous CVA, diabetes, antiplatelet therapy, and carotid artery disease.  He presents today for follow-up on the above conditions.    Interval History:  He denies any chest pain pressure heaviness or tightness.  He does report some shortness of breath however this is not out of character for him.  He denies any PND orthopnea.  He denies any syncope or near syncope.  He reports feeling well from a cardiac perspective.    He reports that he had labs drawn in your office on Monday.  He will have carotids done later today.  We will request both for review.    Assessment & Plan    Impressions:  Coronary artery disease status post three-vessel CABG 2008  Carotid artery disease status post carotid endarterectomy     Followed by Dr. Castellon  Hypertension  Hypertensive cardiovascular disease  Hyperlipidemia  Previous CVA  Diabetes mellitus  Antiplatelet therapy  COPD    Recommendations:  Continuation of his current cardiovascular regimen at the present time.  Follow-up in 6 months time sooner should there be difficulties  Obtain labs from your office.    Objective:    Vitals:  Vitals:    08/05/21 1045   BP: 132/70   BP Location: Left arm   Patient Position: Sitting   Cuff Size: Large Adult   Pulse: 86   SpO2: 95%   Weight: 101 kg (222 lb 3.2 oz)   Height: 177.8 cm (70\")       Physical Exam:    General: Alert, cooperative, no distress, appears " stated age  Head:  Normocephalic, atraumatic, mucous membranes moist  Eyes:  Conjunctiva/corneas clear, EOM's intact     Neck:  Supple, bruit noted  Lungs: Clear to auscultation bilaterally, no wheezes rhonchi rales are noted  Chest wall: No tenderness  Heart::  Regular rate and rhythm, S1 and S2 normal, 1/6 holosystolic murmur.  No rub or gallop  Abdomen: Soft, non-tender, nondistended bowel sounds active  Extremities: No cyanosis, clubbing, or edema  Pulses: Diminished pedal pulses.  Skin:  No rashes or lesions  Neuro/psych: A&O x3. CN II through XII are grossly intact with appropriate affect      Allergies:  No Known Allergies    Medication Review:     Current Outpatient Medications:   •  ACCU-CHEK CHANDU PLUS test strip, TEST EVERY DAY, Disp: , Rfl: 3  •  ACCU-CHEK SOFTCLIX LANCETS lancets, USE ONE LANCET A DAY FOR CHECKING BLOOD SUGAR., Disp: , Rfl: 3  •  ascorbic acid (VITAMIN C) 1000 MG tablet, Daily., Disp: , Rfl:   •  aspirin (ASPIR-LOW) 81 MG EC tablet, Take 81 mg by mouth Daily., Disp: , Rfl:   •  Cetirizine HCl (ZYRTEC ALLERGY) 10 MG capsule, Daily., Disp: , Rfl:   •  desoximetasone (TOPICORT) 0.25 % cream, APPLY TO AFFECTED AREA TWICE A DAY, Disp: , Rfl:   •  ibuprofen (ADVIL,MOTRIN) 800 MG tablet, Take 800 mg by mouth Every 8 (Eight) Hours As Needed. for pain, Disp: , Rfl:   •  JANUVIA 100 MG tablet, Take 100 mg by mouth Daily., Disp: , Rfl: 3  •  lansoprazole (PREVACID) 15 MG capsule, Take 15 mg by mouth Daily., Disp: , Rfl:   •  losartan (COZAAR) 100 MG tablet, Daily., Disp: , Rfl:   •  metFORMIN (GLUCOPHAGE) 500 MG tablet, Take 1,000 mg by mouth 2 (Two) Times a Day., Disp: , Rfl: 3  •  metoprolol succinate XL (TOPROL-XL) 25 MG 24 hr tablet, Take 25 mg by mouth Daily., Disp: , Rfl: 3  •  MULTIPLE VITAMIN PO, MULTIVITAMINS TABS, Disp: , Rfl:   •  Mupirocin 2 % kit, 2 (Two) Times a Day., Disp: , Rfl:   •  niacin 500 MG tablet, Take 500 mg by mouth Daily With Breakfast., Disp: , Rfl:   •  nitroglycerin  (NITROSTAT) 0.4 MG SL tablet, NITROSTAT 0.4 MG SUBL, Disp: , Rfl:   •  simvastatin (ZOCOR) 10 MG tablet, Take 10 mg by mouth Every Night., Disp: , Rfl:     Family History:  Family History   Problem Relation Age of Onset   • Heart disease Mother    • Stroke Mother    • Hypertension Mother    • Diabetes Father    • Hypertension Father    • Diabetes Brother    • Hypertension Brother        Past Medical History:  Past Medical History:   Diagnosis Date   • Coronary artery disease    • Diabetes mellitus (CMS/HCC)    • H/O carotid endarterectomy        • Hyperlipidemia    • Hypertension    • Sleep apnea     C-pap machine    • Stroke (CMS/HCC)     Mini stroke        Past surgical History:  Past Surgical History:   Procedure Laterality Date   • ARTERIAL BYPASS SURGERY     • CARDIAC CATHETERIZATION     • CAROTID ENDARTERECTOMY      bilaterally / Dr. Oconnor    • CORONARY ARTERY BYPASS GRAFT      triple CABG       Social History:  Social History     Socioeconomic History   • Marital status:      Spouse name: Not on file   • Number of children: Not on file   • Years of education: Not on file   • Highest education level: Not on file   Tobacco Use   • Smoking status: Former Smoker     Packs/day: 1.00     Types: Cigarettes     Quit date:      Years since quittin.6   • Smokeless tobacco: Never Used   Vaping Use   • Vaping Use: Never used   Substance and Sexual Activity   • Alcohol use: No   • Drug use: Never       Review of Systems:  The following systems were reviewed as they relate to the cardiovascular system: Constitutional, Eyes, ENT, Cardiovascular, Respiratory, Gastrointestinal, Integumentary, Neurological, Psychiatric, Hematologic, Endocrine, Musculoskeletal, and Genitourinary. The pertinent cardiovascular findings are reported above with all other cardiovascular points within those systems being negative.    Diagnostic Study Review:     Current Electrocardiogram:    ECG 12 Lead    Date/Time: 2021  1:19 PM  Performed by: Kyle Adam DO  Authorized by: Kyle Adam DO   Comparison: not compared with previous ECG   Previous ECG: no previous ECG available  Comments: Normal sinus rhythm with a ventricular rate of 82 bpm.  Right bundle branch block and left anterior fascicular block.  Normal QT and QTc intervals.             NOTE: The following portions of the patient's note were reviewed, confirmed and/or updated this visit as appropriate: History of present illness/Interval history, physical examination, assessment & plan, allergies, current medications, past family history, past medical history, past social history, past surgical history and problem list.

## 2022-02-15 ENCOUNTER — OFFICE VISIT (OUTPATIENT)
Dept: CARDIOLOGY | Facility: CLINIC | Age: 77
End: 2022-02-15

## 2022-02-15 VITALS
OXYGEN SATURATION: 96 % | HEIGHT: 70 IN | DIASTOLIC BLOOD PRESSURE: 65 MMHG | HEART RATE: 85 BPM | SYSTOLIC BLOOD PRESSURE: 109 MMHG | RESPIRATION RATE: 18 BRPM | BODY MASS INDEX: 31.5 KG/M2 | WEIGHT: 220 LBS

## 2022-02-15 DIAGNOSIS — J43.8 OTHER EMPHYSEMA: ICD-10-CM

## 2022-02-15 DIAGNOSIS — Z95.1 HX OF CABG: ICD-10-CM

## 2022-02-15 DIAGNOSIS — Z79.02 LONG TERM (CURRENT) USE OF ANTITHROMBOTICS/ANTIPLATELETS: ICD-10-CM

## 2022-02-15 DIAGNOSIS — I10 ESSENTIAL HYPERTENSION: Primary | ICD-10-CM

## 2022-02-15 DIAGNOSIS — I25.10 CORONARY ARTERY DISEASE INVOLVING NATIVE CORONARY ARTERY OF NATIVE HEART WITHOUT ANGINA PECTORIS: ICD-10-CM

## 2022-02-15 DIAGNOSIS — E78.2 MIXED HYPERLIPIDEMIA: ICD-10-CM

## 2022-02-15 PROCEDURE — 99214 OFFICE O/P EST MOD 30 MIN: CPT | Performed by: INTERNAL MEDICINE

## 2022-02-15 PROCEDURE — 93000 ELECTROCARDIOGRAM COMPLETE: CPT | Performed by: INTERNAL MEDICINE

## 2022-02-15 RX ORDER — LOSARTAN POTASSIUM 50 MG/1
50 TABLET ORAL DAILY
COMMUNITY
Start: 2021-12-23

## 2022-02-15 NOTE — PROGRESS NOTES
Cardiology Office Visit      Encounter Date:  02/15/2022    Patient ID:   Ras Zhao is a 76 y.o. male.    Reason For Followup:  Coronary artery disease  Hypertension  Hypertensive cardiovascular disease  Hyperlipidemia  Abnormal EKG    Brief Clinical History:  Dear Carlos Enrique Guerrero MD    I had the pleasure of seeing Ras Zhao today. As you are well aware, this is a 76 y.o. male with a history of coronary artery disease.  He is undergone three-vessel CABG in 2008. He has additional history that includes hypertension, hypertensive cardiovascular disease, hyperlipidemia, previous CVA, diabetes, antiplatelet therapy, and carotid artery disease.  He presents today for follow-up on the above conditions.    Interval History:  He denies any chest pain pressure heaviness or tightness.  He does report some shortness of breath however this is not out of character for him.  He denies any PND orthopnea.  He denies any syncope or near syncope.  He reports feeling well from a cardiac perspective.    His initial blood pressure in the office today was hypertensive however on recheck it was 109/65.  He reports he will have labs performed in your office later this week.  We will request those for review.  We are interested in CBC, chemistry, and lipids.    Assessment & Plan    Impressions:  Coronary artery disease status post three-vessel CABG 2008  Carotid artery disease status post carotid endarterectomy     Followed by Dr. Castellon  Hypertension  Hypertensive cardiovascular disease  Hyperlipidemia  Previous CVA  Diabetes mellitus  Antiplatelet therapy  COPD    Recommendations:  Continuation of his current cardiovascular regimen at the present time.     This includes antiplatelet, antihypertensives, and statin therapy.  Follow-up in 6 months time sooner should there be difficulties  Obtain labs from your office.    Diagnoses and all orders for this visit:    1. Essential hypertension (Primary)  -     ECG 12 Lead    2.  "Mixed hyperlipidemia    3. Coronary artery disease involving native coronary artery of native heart without angina pectoris  -     ECG 12 Lead    4. Hx of CABG  -     ECG 12 Lead    5. Long term (current) use of antithrombotics/antiplatelets    6. Other emphysema (HCC)          Objective:    Vitals:  Vitals:    02/15/22 0854 02/15/22 0917   BP: 152/71 109/65   BP Location: Left arm    Patient Position: Sitting    Cuff Size: Large Adult    Pulse: 85    Resp: 18    SpO2: 96%    Weight: 99.8 kg (220 lb)    Height: 177.8 cm (70\")      Body mass index is 31.57 kg/m².      Physical Exam:    General: Alert, cooperative, no distress, appears stated age  Head:  Normocephalic, atraumatic, mucous membranes moist  Eyes:  Conjunctiva/corneas clear, EOM's intact     Neck:  Supple,  no bruit    Lungs: Clear to auscultation bilaterally, no wheezes rhonchi rales are noted  Chest wall: No tenderness  Heart::  Regular rate and rhythm, S1 and S2 normal, 1/6 holosystolic murmur.  No rub or gallop  Abdomen: Soft, non-tender, nondistended bowel sounds active  Extremities: No cyanosis, clubbing, or edema  Pulses: 2+ and symmetric all extremities  Skin:  No rashes or lesions  Neuro/psych: A&O x3. CN II through XII are grossly intact with appropriate affect      Allergies:  No Known Allergies    Medication Review:     Current Outpatient Medications:   •  ACCU-CHEK CHANDU PLUS test strip, TEST EVERY DAY, Disp: , Rfl: 3  •  ACCU-CHEK SOFTCLIX LANCETS lancets, USE ONE LANCET A DAY FOR CHECKING BLOOD SUGAR., Disp: , Rfl: 3  •  ascorbic acid (VITAMIN C) 1000 MG tablet, Daily., Disp: , Rfl:   •  aspirin (ASPIR-LOW) 81 MG EC tablet, Take 81 mg by mouth Daily., Disp: , Rfl:   •  Cetirizine HCl (ZYRTEC ALLERGY) 10 MG capsule, Daily., Disp: , Rfl:   •  desoximetasone (TOPICORT) 0.25 % cream, APPLY TO AFFECTED AREA TWICE A DAY, Disp: , Rfl:   •  ibuprofen (ADVIL,MOTRIN) 800 MG tablet, Take 800 mg by mouth Every 8 (Eight) Hours As Needed. for pain, Disp: " , Rfl:   •  JANUVIA 100 MG tablet, Take 100 mg by mouth Daily., Disp: , Rfl: 3  •  lansoprazole (PREVACID) 15 MG capsule, Take 15 mg by mouth Daily., Disp: , Rfl:   •  losartan (COZAAR) 50 MG tablet, Take 50 mg by mouth Daily., Disp: , Rfl:   •  metFORMIN (GLUCOPHAGE) 500 MG tablet, Take 1,000 mg by mouth 2 (Two) Times a Day., Disp: , Rfl: 3  •  metoprolol succinate XL (TOPROL-XL) 25 MG 24 hr tablet, Take 25 mg by mouth Daily., Disp: , Rfl: 3  •  MULTIPLE VITAMIN PO, MULTIVITAMINS TABS, Disp: , Rfl:   •  Mupirocin 2 % kit, 2 (Two) Times a Day., Disp: , Rfl:   •  niacin 500 MG tablet, Take 500 mg by mouth Daily With Breakfast., Disp: , Rfl:   •  nitroglycerin (NITROSTAT) 0.4 MG SL tablet, NITROSTAT 0.4 MG SUBL, Disp: , Rfl:   •  simvastatin (ZOCOR) 10 MG tablet, Take 10 mg by mouth Every Night., Disp: , Rfl:     Family History:  Family History   Problem Relation Age of Onset   • Heart disease Mother    • Stroke Mother    • Hypertension Mother    • Diabetes Father    • Hypertension Father    • Diabetes Brother    • Hypertension Brother        Past Medical History:  Past Medical History:   Diagnosis Date   • Coronary artery disease    • Diabetes mellitus (HCC)    • H/O carotid endarterectomy        • Hyperlipidemia    • Hypertension    • Sleep apnea     C-pap machine    • Stroke (HCC)     Mini stroke        Past Surgical History:  Past Surgical History:   Procedure Laterality Date   • ARTERIAL BYPASS SURGERY     • CARDIAC CATHETERIZATION     • CAROTID ENDARTERECTOMY      bilaterally / Dr. Oconnor    • CORONARY ARTERY BYPASS GRAFT      triple CABG       Social History:  Social History     Socioeconomic History   • Marital status:    Tobacco Use   • Smoking status: Former Smoker     Packs/day: 1.00     Types: Cigarettes     Quit date:      Years since quittin.1   • Smokeless tobacco: Never Used   Vaping Use   • Vaping Use: Never used   Substance and Sexual Activity   • Alcohol use: No   • Drug use:  Never       Review of Systems:  The following systems were reviewed as they relate to the cardiovascular system: Constitutional, Eyes, ENT, Cardiovascular, Respiratory, Gastrointestinal, Integumentary, Neurological, Psychiatric, Hematologic, Endocrine, Musculoskeletal, and Genitourinary. The pertinent cardiovascular findings are reported above with all other cardiovascular points within those systems being negative.    Diagnostic Study Review:     Current Electrocardiogram:    ECG 12 Lead    Date/Time: 2/15/2022 7:34 PM  Performed by: Kyle Adam DO  Authorized by: Kyle Adam DO   Comparison: not compared with previous ECG   Previous ECG: no previous ECG available  Comments:  Normal sinus rhythm with a ventricular rate of 91 bpm.  Nonspecific interventricular conduction delay.  Nonspecific repolarization changes.  Normal QT and QTc intervals.  Normal QRS axis.            Laboratory Data:  No results found for: GLUCOSE, BUN, CREATININE, EGFRIFNONA, EGFRIFAFRI, BCR, K, CO2, CALCIUM, PROTENTOTREF, ALBUMIN, LABIL2, BILIRUBIN, AST, ALT  No results found for: GLUCOSE, CALCIUM, NA, K, CO2, CL, BUN, CREATININE, EGFRIFAFRI, EGFRIFNONA, BCR, ANIONGAP  No results found for: WBC, HGB, HCT, MCV, PLT  No results found for: CHOL, CHLPL, TRIG, HDL, LDL, LDLDIRECT  No results found for: HGBA1C  No results found for: INR, PROTIME    Most Recent Echo:       Most Recent Stress Test:  Results for orders placed during the hospital encounter of 12/08/20    Stress Test With Myocardial Perfusion One Day    Interpretation Summary  · Myocardial perfusion imaging indicates a normal myocardial perfusion study with no evidence of ischemia.  · Left ventricular ejection fraction is borderline normal. (Calculated EF = 50%).  · Findings consistent with an indeterminate ECG stress test.  · Impressions are consistent with a low risk study.  · There is no prior study available for comparison.       Most Recent Cardiac  Catheterization:   No results found for this or any previous visit.       NOTE: The following portions of the patient's note were reviewed, confirmed and/or updated this visit as appropriate: History of present illness/Interval history, physical examination, assessment & plan, allergies, current medications, past family history, past medical history, past social history, past surgical history and problem list.

## 2022-08-02 ENCOUNTER — OFFICE VISIT (OUTPATIENT)
Dept: CARDIOLOGY | Facility: CLINIC | Age: 77
End: 2022-08-02

## 2022-08-02 VITALS
WEIGHT: 222 LBS | HEIGHT: 70 IN | OXYGEN SATURATION: 96 % | DIASTOLIC BLOOD PRESSURE: 71 MMHG | HEART RATE: 80 BPM | SYSTOLIC BLOOD PRESSURE: 150 MMHG | BODY MASS INDEX: 31.78 KG/M2

## 2022-08-02 DIAGNOSIS — Z79.02 LONG TERM (CURRENT) USE OF ANTITHROMBOTICS/ANTIPLATELETS: ICD-10-CM

## 2022-08-02 DIAGNOSIS — I25.10 CORONARY ARTERY DISEASE INVOLVING NATIVE CORONARY ARTERY OF NATIVE HEART WITHOUT ANGINA PECTORIS: Primary | ICD-10-CM

## 2022-08-02 DIAGNOSIS — E78.2 MIXED HYPERLIPIDEMIA: ICD-10-CM

## 2022-08-02 DIAGNOSIS — I10 ESSENTIAL HYPERTENSION: ICD-10-CM

## 2022-08-02 DIAGNOSIS — Z95.1 HX OF CABG: ICD-10-CM

## 2022-08-02 PROCEDURE — 99214 OFFICE O/P EST MOD 30 MIN: CPT | Performed by: INTERNAL MEDICINE

## 2022-08-02 PROCEDURE — 93000 ELECTROCARDIOGRAM COMPLETE: CPT | Performed by: INTERNAL MEDICINE

## 2022-08-02 NOTE — PROGRESS NOTES
Cardiology Office Visit      Encounter Date:  08/02/2022    Patient ID:   Ras Zhao is a 77 y.o. male.    Reason For Followup:  Coronary artery disease  Hypertension  Hypertensive cardiovascular disease  Hyperlipidemia  Abnormal EKG    Brief Clinical History:  Dear Carlos Enrique Guerrero MD    I had the pleasure of seeing Ras Zhao today. As you are well aware, this is a 77 y.o. male with a history of coronary artery disease.  He is undergone three-vessel CABG in 2008. He has additional history that includes hypertension, hypertensive cardiovascular disease, hyperlipidemia, previous CVA, diabetes, antiplatelet therapy, and carotid artery disease.  He presents today for follow-up on the above conditions.    Interval History:  He denies any chest pain pressure heaviness or tightness.  He does report some shortness of breath however this is not out of character for him.  He denies any PND orthopnea.  He denies any syncope or near syncope.  He reports feeling well from a cardiac perspective.    His blood pressure is elevated in the office today however he reports that his blood pressure at home is typically in the 130s.  He reports that he went to [place] yesterday and it was 131 systolic there.    We reviewed his most recent laboratory data.  Of particular note, his hemoglobin A1c was 10%.  The remainder of his laboratory value is attached to the laboratory section.  He reports he is working with you to get this under control.  He also reports that he will be seeing vascular surgery to evaluate his carotid arteries.      Assessment & Plan    Impressions:  Coronary artery disease status post three-vessel CABG 2008  Carotid artery disease status post carotid endarterectomy     Followed by Dr. Castellon  Hypertension  Hypertensive cardiovascular disease  Hyperlipidemia  Previous CVA  Diabetes mellitus  Antiplatelet therapy  COPD    Recommendations:  Continuation of his current cardiovascular regimen at the  "present time.     This includes antiplatelet, antihypertensives, and statin therapy.  Follow-up in 6 months time sooner should there be difficulties      Diagnoses and all orders for this visit:    1. Coronary artery disease involving native coronary artery of native heart without angina pectoris (Primary)  -     ECG 12 Lead    2. Essential hypertension  -     ECG 12 Lead    3. Hx of CABG  -     ECG 12 Lead    4. Mixed hyperlipidemia  -     ECG 12 Lead    5. Long term (current) use of antithrombotics/antiplatelets  -     ECG 12 Lead          Objective:    Vitals:  Vitals:    08/02/22 1105   BP: 150/71   Pulse: 80   SpO2: 96%   Weight: 101 kg (222 lb)   Height: 177.8 cm (70\")     Body mass index is 31.85 kg/m².      Physical Exam:    General: Alert, cooperative, no distress, appears stated age  Head:  Normocephalic, atraumatic, mucous membranes moist  Eyes:  Conjunctiva/corneas clear, EOM's intact     Neck:  Supple,  no bruit    Lungs: Clear to auscultation bilaterally, no wheezes rhonchi rales are noted  Chest wall: No tenderness  Heart::  Regular rate and rhythm, S1 and S2 normal, 1/6 holosystolic murmur.  No rub or gallop  Abdomen: Soft, non-tender, nondistended bowel sounds active  Extremities: No cyanosis, clubbing, or edema  Pulses: 2+ and symmetric all extremities  Skin:  No rashes or lesions  Neuro/psych: A&O x3. CN II through XII are grossly intact with appropriate affect      Allergies:  No Known Allergies    Medication Review:     Current Outpatient Medications:   •  ACCU-CHEK CHANDU PLUS test strip, TEST EVERY DAY, Disp: , Rfl: 3  •  ACCU-CHEK SOFTCLIX LANCETS lancets, USE ONE LANCET A DAY FOR CHECKING BLOOD SUGAR., Disp: , Rfl: 3  •  ascorbic acid (VITAMIN C) 1000 MG tablet, Daily., Disp: , Rfl:   •  aspirin (aspirin) 81 MG EC tablet, Take 81 mg by mouth Daily., Disp: , Rfl:   •  Cetirizine HCl 10 MG capsule, Daily., Disp: , Rfl:   •  JANUVIA 100 MG tablet, Take 100 mg by mouth Daily., Disp: , Rfl: 3  •  " lansoprazole (PREVACID) 15 MG capsule, Take 15 mg by mouth Daily., Disp: , Rfl:   •  losartan (COZAAR) 50 MG tablet, Take 50 mg by mouth Daily., Disp: , Rfl:   •  metFORMIN (GLUCOPHAGE) 500 MG tablet, Take 1,000 mg by mouth 2 (Two) Times a Day., Disp: , Rfl: 3  •  metoprolol succinate XL (TOPROL-XL) 25 MG 24 hr tablet, Take 25 mg by mouth Daily., Disp: , Rfl: 3  •  MULTIPLE VITAMIN PO, MULTIVITAMINS TABS, Disp: , Rfl:   •  Mupirocin 2 % kit, 2 (Two) Times a Day., Disp: , Rfl:   •  nitroglycerin (NITROSTAT) 0.4 MG SL tablet, NITROSTAT 0.4 MG SUBL, Disp: , Rfl:   •  simvastatin (ZOCOR) 10 MG tablet, Take 10 mg by mouth Every Night., Disp: , Rfl:     Family History:  Family History   Problem Relation Age of Onset   • Heart disease Mother    • Stroke Mother    • Hypertension Mother    • Diabetes Father    • Hypertension Father    • Diabetes Brother    • Hypertension Brother        Past Medical History:  Past Medical History:   Diagnosis Date   • Coronary artery disease    • Diabetes mellitus (HCC)    • H/O carotid endarterectomy        • Hyperlipidemia    • Hypertension    • Sleep apnea     C-pap machine    • Stroke (HCC)     Mini stroke        Past Surgical History:  Past Surgical History:   Procedure Laterality Date   • ARTERIAL BYPASS SURGERY     • CARDIAC CATHETERIZATION     • CAROTID ENDARTERECTOMY      bilaterally / Dr. Oconnor    • CORONARY ARTERY BYPASS GRAFT      triple CABG       Social History:  Social History     Socioeconomic History   • Marital status:    Tobacco Use   • Smoking status: Former Smoker     Packs/day: 1.00     Types: Cigarettes     Quit date:      Years since quittin.5   • Smokeless tobacco: Never Used   Vaping Use   • Vaping Use: Never used   Substance and Sexual Activity   • Alcohol use: No   • Drug use: Never   • Sexual activity: Defer       Review of Systems:  The following systems were reviewed as they relate to the cardiovascular system: Constitutional, Eyes, ENT,  Cardiovascular, Respiratory, Gastrointestinal, Integumentary, Neurological, Psychiatric, Hematologic, Endocrine, Musculoskeletal, and Genitourinary. The pertinent cardiovascular findings are reported above with all other cardiovascular points within those systems being negative.    Diagnostic Study Review:     Current Electrocardiogram:    ECG 12 Lead    Date/Time: 8/2/2022 5:47 PM  Performed by: Kyle Adam DO  Authorized by: Kyle Adam DO   Comparison: not compared with previous ECG   Previous ECG: no previous ECG available  Comments: Normal sinus rhythm with a ventricular rate of 82 bpm.  Nonspecific interventricular conduction delay.  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  Normal QT and prolonged QTC intervals of 410 and 4 and 79 ms respectively.            Laboratory Data:  No results found for: GLUCOSE, BUN, CREATININE, EGFRIFNONA, EGFRIFAFRI, BCR, K, CO2, CALCIUM, PROTENTOTREF, ALBUMIN, LABIL2, BILIRUBIN, AST, ALT  No results found for: GLUCOSE, CALCIUM, NA, K, CO2, CL, BUN, CREATININE, EGFRIFAFRI, EGFRIFNONA, BCR, ANIONGAP  No results found for: WBC, HGB, HCT, MCV, PLT  No results found for: CHOL, CHLPL, TRIG, HDL, LDL, LDLDIRECT  No results found for: HGBA1C  No results found for: INR, PROTIME    Most Recent Echo:       Most Recent Stress Test:  Results for orders placed during the hospital encounter of 12/08/20    Stress Test With Myocardial Perfusion One Day    Interpretation Summary  · Myocardial perfusion imaging indicates a normal myocardial perfusion study with no evidence of ischemia.  · Left ventricular ejection fraction is borderline normal. (Calculated EF = 50%).  · Findings consistent with an indeterminate ECG stress test.  · Impressions are consistent with a low risk study.  · There is no prior study available for comparison.       Most Recent Cardiac Catheterization:   No results found for this or any previous visit.       NOTE: The following portions of the patient's note  were reviewed, confirmed and/or updated this visit as appropriate: History of present illness/Interval history, physical examination, assessment & plan, allergies, current medications, past family history, past medical history, past social history, past surgical history and problem list.

## 2023-01-31 ENCOUNTER — OFFICE VISIT (OUTPATIENT)
Dept: CARDIOLOGY | Facility: CLINIC | Age: 78
End: 2023-01-31
Payer: MEDICARE

## 2023-01-31 VITALS
SYSTOLIC BLOOD PRESSURE: 168 MMHG | HEIGHT: 70 IN | DIASTOLIC BLOOD PRESSURE: 84 MMHG | BODY MASS INDEX: 30.78 KG/M2 | OXYGEN SATURATION: 98 % | HEART RATE: 90 BPM | WEIGHT: 215 LBS

## 2023-01-31 DIAGNOSIS — Z95.1 HX OF CABG: ICD-10-CM

## 2023-01-31 DIAGNOSIS — I10 ESSENTIAL HYPERTENSION: Primary | ICD-10-CM

## 2023-01-31 DIAGNOSIS — Z79.02 LONG TERM (CURRENT) USE OF ANTITHROMBOTICS/ANTIPLATELETS: ICD-10-CM

## 2023-01-31 DIAGNOSIS — I25.10 CORONARY ARTERY DISEASE INVOLVING NATIVE CORONARY ARTERY OF NATIVE HEART WITHOUT ANGINA PECTORIS: ICD-10-CM

## 2023-01-31 DIAGNOSIS — E78.2 MIXED HYPERLIPIDEMIA: ICD-10-CM

## 2023-01-31 PROCEDURE — 99214 OFFICE O/P EST MOD 30 MIN: CPT | Performed by: INTERNAL MEDICINE

## 2023-01-31 PROCEDURE — 93000 ELECTROCARDIOGRAM COMPLETE: CPT | Performed by: INTERNAL MEDICINE

## 2023-01-31 RX ORDER — METOPROLOL SUCCINATE 50 MG/1
50 TABLET, EXTENDED RELEASE ORAL DAILY
Qty: 90 TABLET | Refills: 3 | Status: SHIPPED | OUTPATIENT
Start: 2023-01-31

## 2023-01-31 NOTE — PROGRESS NOTES
Cardiology Office Visit      Encounter Date:  01/31/2023    Patient ID:   Ras Zhao is a 77 y.o. male.    Reason For Followup:  Coronary artery disease  Hypertension  Hypertensive cardiovascular disease  Hyperlipidemia  Abnormal EKG    Brief Clinical History:  Dear Carlos Enrique Guerrero MD    I had the pleasure of seeing Ras Zhao today. As you are well aware, this is a 77 y.o. male with a history of coronary artery disease.  He is undergone three-vessel CABG in 2008. He has additional history that includes hypertension, hypertensive cardiovascular disease, hyperlipidemia, previous CVA, diabetes, antiplatelet therapy, and carotid artery disease.  He presents today for follow-up on the above conditions.    Interval History:  He denies any chest pain pressure heaviness or tightness.  He does report some shortness of breath however this is not out of character for him.  He denies any PND orthopnea.  He denies any syncope or near syncope.  He reports feeling well from a cardiac perspective.    His blood pressure is elevated in the office today and he notes that it has been elevated at home as well.  We discussed options today and he will increase his metoprolol.    He reports that he had labs performed in your office in December.  We will request these for review.  Specifically were looking for CBC, CMP, and lipid profile.    It has been over 10 years since the patient's bypass surgery and 3 years since his last stress test.  We will have him undergo a stress test for screening purposes.      Assessment & Plan    Impressions:  Coronary artery disease status post three-vessel CABG 2008  Carotid artery disease status post carotid endarterectomy     Followed by Dr. Castellon  Hypertension  Hypertensive cardiovascular disease  Hyperlipidemia  Previous CVA  Diabetes mellitus  Antiplatelet therapy  COPD    Recommendations:  Continuation of his current cardiovascular regimen at the present time.     This includes  "antiplatelet, antihypertensives, and statin therapy.  Nuclear stress test 1 month prior to next visit  Follow-up in 6 months time sooner should there be difficulties      Diagnoses and all orders for this visit:    1. Essential hypertension (Primary)  -     Stress Test With Myocardial Perfusion One Day; Future  -     ECG 12 Lead    2. Mixed hyperlipidemia  -     Stress Test With Myocardial Perfusion One Day; Future  -     ECG 12 Lead    3. Coronary artery disease involving native coronary artery of native heart without angina pectoris  -     Stress Test With Myocardial Perfusion One Day; Future  -     ECG 12 Lead    4. Hx of CABG  -     Stress Test With Myocardial Perfusion One Day; Future  -     ECG 12 Lead    5. Long term (current) use of antithrombotics/antiplatelets  -     Stress Test With Myocardial Perfusion One Day; Future  -     ECG 12 Lead    Other orders  -     metoprolol succinate XL (TOPROL-XL) 50 MG 24 hr tablet; Take 1 tablet by mouth Daily.  Dispense: 90 tablet; Refill: 3          Objective:    Vitals:  Vitals:    01/31/23 0836   BP: 168/84   Pulse: 90   SpO2: 98%   Weight: 97.5 kg (215 lb)   Height: 177.8 cm (70\")     Body mass index is 30.85 kg/m².      Physical Exam:    General: Alert, cooperative, no distress, appears stated age  Head:  Normocephalic, atraumatic, mucous membranes moist  Eyes:  Conjunctiva/corneas clear, EOM's intact     Neck:  Supple,  no bruit    Lungs: Clear to auscultation bilaterally, no wheezes rhonchi rales are noted  Chest wall: No tenderness  Heart::  Regular rate and rhythm, S1 and S2 normal, 1/6 holosystolic murmur.  No rub or gallop  Abdomen: Soft, non-tender, nondistended bowel sounds active  Extremities: No cyanosis, clubbing, or edema  Pulses: 2+ and symmetric all extremities  Skin:  No rashes or lesions  Neuro/psych: A&O x3. CN II through XII are grossly intact with appropriate affect      Allergies:  No Known Allergies    Medication Review:     Current Outpatient " Medications:   •  ascorbic acid (VITAMIN C) 1000 MG tablet, Daily., Disp: , Rfl:   •  aspirin (aspirin) 81 MG EC tablet, Take 81 mg by mouth Daily., Disp: , Rfl:   •  Cetirizine HCl 10 MG capsule, Daily., Disp: , Rfl:   •  JANUVIA 100 MG tablet, Take 100 mg by mouth Daily., Disp: , Rfl: 3  •  lansoprazole (PREVACID) 15 MG capsule, Take 15 mg by mouth Daily., Disp: , Rfl:   •  losartan (COZAAR) 50 MG tablet, Take 50 mg by mouth Daily., Disp: , Rfl:   •  metFORMIN (GLUCOPHAGE) 500 MG tablet, Take 1,000 mg by mouth 2 (Two) Times a Day., Disp: , Rfl: 3  •  MULTIPLE VITAMIN PO, MULTIVITAMINS TABS, Disp: , Rfl:   •  nitroglycerin (NITROSTAT) 0.4 MG SL tablet, NITROSTAT 0.4 MG SUBL, Disp: , Rfl:   •  simvastatin (ZOCOR) 10 MG tablet, Take 10 mg by mouth Every Night., Disp: , Rfl:   •  ACCU-CHEK CHANDU PLUS test strip, TEST EVERY DAY, Disp: , Rfl: 3  •  ACCU-CHEK SOFTCLIX LANCETS lancets, USE ONE LANCET A DAY FOR CHECKING BLOOD SUGAR., Disp: , Rfl: 3  •  metoprolol succinate XL (TOPROL-XL) 50 MG 24 hr tablet, Take 1 tablet by mouth Daily., Disp: 90 tablet, Rfl: 3    Family History:  Family History   Problem Relation Age of Onset   • Heart disease Mother    • Stroke Mother    • Hypertension Mother    • Diabetes Father    • Hypertension Father    • Diabetes Brother    • Hypertension Brother    • Heart disease Brother        Past Medical History:  Past Medical History:   Diagnosis Date   • Asthma    • Coronary artery disease    • Diabetes mellitus (HCC)    • H/O carotid endarterectomy     2008   • Hyperlipidemia    • Hypertension    • Sleep apnea     C-pap machine    • Stroke (HCC)     Mini stroke        Past Surgical History:  Past Surgical History:   Procedure Laterality Date   • ARTERIAL BYPASS SURGERY     • CARDIAC CATHETERIZATION     • CAROTID ENDARTERECTOMY      bilaterally / Dr. Oconnor    • CORONARY ARTERY BYPASS GRAFT      triple CABG       Social History:  Social History     Socioeconomic History   • Marital status:     Tobacco Use   • Smoking status: Former     Packs/day: 1.00     Years: 45.00     Pack years: 45.00     Types: Cigarettes     Start date: 1957     Quit date: 2002     Years since quittin.0   • Smokeless tobacco: Never   Vaping Use   • Vaping Use: Never used   Substance and Sexual Activity   • Alcohol use: No   • Drug use: Never   • Sexual activity: Not Currently       Review of Systems:  The following systems were reviewed as they relate to the cardiovascular system: Constitutional, Eyes, ENT, Cardiovascular, Respiratory, Gastrointestinal, Integumentary, Neurological, Psychiatric, Hematologic, Endocrine, Musculoskeletal, and Genitourinary. The pertinent cardiovascular findings are reported above with all other cardiovascular points within those systems being negative.    Diagnostic Study Review:     Current Electrocardiogram:    ECG 12 Lead    Date/Time: 2023 11:05 AM  Performed by: Kyle Adam DO  Authorized by: Kyle Adam DO   Comparison: not compared with previous ECG   Previous ECG: no previous ECG available  Comments: Normal sinus rhythm with a ventricular rate of 86 bpm.  Consider old anterior MI.  Right bundle branch block.  Normal QT and prolonged QTC intervals of 412 and 493 ms respectively.            Laboratory Data:  No results found for: GLUCOSE, BUN, CREATININE, EGFRIFNONA, EGFRIFAFRI, BCR, K, CO2, CALCIUM, PROTENTOTREF, ALBUMIN, LABIL2, BILIRUBIN, AST, ALT  No results found for: GLUCOSE, CALCIUM, NA, K, CO2, CL, BUN, CREATININE, EGFRIFAFRI, EGFRIFNONA, BCR, ANIONGAP  No results found for: WBC, HGB, HCT, MCV, PLT  No results found for: CHOL, CHLPL, TRIG, HDL, LDL, LDLDIRECT  No results found for: HGBA1C  No results found for: INR, PROTIME    Most Recent Echo:       Most Recent Stress Test:  Results for orders placed during the hospital encounter of 20    Stress Test With Myocardial Perfusion One Day    Interpretation Summary  ·  Myocardial perfusion imaging indicates a normal myocardial perfusion study with no evidence of ischemia.  · Left ventricular ejection fraction is borderline normal. (Calculated EF = 50%).  · Findings consistent with an indeterminate ECG stress test.  · Impressions are consistent with a low risk study.  · There is no prior study available for comparison.       Most Recent Cardiac Catheterization:   No results found for this or any previous visit.       NOTE: The following portions of the patient's note were reviewed, confirmed and/or updated this visit as appropriate: History of present illness/Interval history, physical examination, assessment & plan, allergies, current medications, past family history, past medical history, past social history, past surgical history and problem list.

## 2023-01-31 NOTE — PATIENT INSTRUCTIONS
Get labs from [facility] office from December  Nuclear stress test 1 month prior to next visit  Increase metoprolol to 50 mg daily  Follow-up 6 months

## 2023-08-01 ENCOUNTER — OFFICE VISIT (OUTPATIENT)
Dept: CARDIOLOGY | Facility: CLINIC | Age: 78
End: 2023-08-01
Payer: MEDICARE

## 2023-08-01 VITALS
SYSTOLIC BLOOD PRESSURE: 126 MMHG | DIASTOLIC BLOOD PRESSURE: 58 MMHG | WEIGHT: 222 LBS | HEART RATE: 79 BPM | BODY MASS INDEX: 31.78 KG/M2 | OXYGEN SATURATION: 96 % | HEIGHT: 70 IN

## 2023-08-01 DIAGNOSIS — I10 ESSENTIAL HYPERTENSION: ICD-10-CM

## 2023-08-01 DIAGNOSIS — I25.10 CORONARY ARTERY DISEASE INVOLVING NATIVE CORONARY ARTERY OF NATIVE HEART WITHOUT ANGINA PECTORIS: Primary | ICD-10-CM

## 2023-08-01 DIAGNOSIS — E78.2 MIXED HYPERLIPIDEMIA: ICD-10-CM

## 2023-08-01 DIAGNOSIS — Z95.1 HX OF CABG: ICD-10-CM

## 2023-08-01 DIAGNOSIS — Z79.02 LONG TERM (CURRENT) USE OF ANTITHROMBOTICS/ANTIPLATELETS: ICD-10-CM

## 2023-08-01 PROCEDURE — 93000 ELECTROCARDIOGRAM COMPLETE: CPT | Performed by: INTERNAL MEDICINE

## 2023-08-01 PROCEDURE — 1160F RVW MEDS BY RX/DR IN RCRD: CPT | Performed by: INTERNAL MEDICINE

## 2023-08-01 PROCEDURE — 3078F DIAST BP <80 MM HG: CPT | Performed by: INTERNAL MEDICINE

## 2023-08-01 PROCEDURE — 1159F MED LIST DOCD IN RCRD: CPT | Performed by: INTERNAL MEDICINE

## 2023-08-01 PROCEDURE — 99214 OFFICE O/P EST MOD 30 MIN: CPT | Performed by: INTERNAL MEDICINE

## 2023-08-01 PROCEDURE — 3074F SYST BP LT 130 MM HG: CPT | Performed by: INTERNAL MEDICINE

## 2023-08-01 RX ORDER — LINACLOTIDE 72 UG/1
CAPSULE, GELATIN COATED ORAL
COMMUNITY
Start: 2023-06-26

## 2023-08-07 ENCOUNTER — ON CAMPUS - OUTPATIENT (AMBULATORY)
Dept: URBAN - METROPOLITAN AREA HOSPITAL 77 | Facility: HOSPITAL | Age: 78
End: 2023-08-07
Payer: MEDICARE

## 2023-08-07 DIAGNOSIS — K57.30 DIVERTICULOSIS OF LARGE INTESTINE WITHOUT PERFORATION OR ABS: ICD-10-CM

## 2023-08-07 DIAGNOSIS — D12.5 BENIGN NEOPLASM OF SIGMOID COLON: ICD-10-CM

## 2023-08-07 DIAGNOSIS — Z86.010 PERSONAL HISTORY OF COLONIC POLYPS: ICD-10-CM

## 2023-08-07 DIAGNOSIS — D12.0 BENIGN NEOPLASM OF CECUM: ICD-10-CM

## 2023-08-07 DIAGNOSIS — K64.8 OTHER HEMORRHOIDS: ICD-10-CM

## 2023-08-07 PROCEDURE — 45385 COLONOSCOPY W/LESION REMOVAL: CPT | Mod: 33 | Performed by: INTERNAL MEDICINE

## 2023-12-22 RX ORDER — METOPROLOL SUCCINATE 50 MG/1
50 TABLET, EXTENDED RELEASE ORAL DAILY
Qty: 90 TABLET | Refills: 3 | Status: SHIPPED | OUTPATIENT
Start: 2023-12-22

## 2024-02-14 ENCOUNTER — OFFICE VISIT (OUTPATIENT)
Dept: CARDIOLOGY | Facility: CLINIC | Age: 79
End: 2024-02-14
Payer: MEDICARE

## 2024-02-14 VITALS
SYSTOLIC BLOOD PRESSURE: 157 MMHG | HEIGHT: 70 IN | HEART RATE: 79 BPM | WEIGHT: 214 LBS | BODY MASS INDEX: 30.64 KG/M2 | DIASTOLIC BLOOD PRESSURE: 77 MMHG

## 2024-02-14 DIAGNOSIS — E78.2 MIXED HYPERLIPIDEMIA: ICD-10-CM

## 2024-02-14 DIAGNOSIS — I10 ESSENTIAL HYPERTENSION: ICD-10-CM

## 2024-02-14 DIAGNOSIS — I25.10 CORONARY ARTERY DISEASE INVOLVING NATIVE CORONARY ARTERY OF NATIVE HEART WITHOUT ANGINA PECTORIS: Primary | ICD-10-CM

## 2024-02-14 DIAGNOSIS — Z79.02 LONG TERM (CURRENT) USE OF ANTITHROMBOTICS/ANTIPLATELETS: ICD-10-CM

## 2024-02-14 DIAGNOSIS — Z95.1 HX OF CABG: ICD-10-CM

## 2025-07-28 ENCOUNTER — LAB REQUISITION (OUTPATIENT)
Dept: LAB | Facility: HOSPITAL | Age: 80
End: 2025-07-28
Payer: MEDICARE

## 2025-07-28 DIAGNOSIS — N20.1 CALCULUS OF URETER: ICD-10-CM

## 2025-07-28 PROCEDURE — 82365 CALCULUS SPECTROSCOPY: CPT | Performed by: UROLOGY

## 2025-08-07 LAB
COLOR STONE: NORMAL
COM MFR STONE: 20 %
LABORATORY COMMENT REPORT: NORMAL
SIZE STONE: NORMAL MM
SPEC SOURCE SUBJ: NORMAL
URATE MFR STONE: 80 %
WT STONE: 320 MG